# Patient Record
Sex: MALE | URBAN - METROPOLITAN AREA
[De-identification: names, ages, dates, MRNs, and addresses within clinical notes are randomized per-mention and may not be internally consistent; named-entity substitution may affect disease eponyms.]

---

## 2021-03-24 ENCOUNTER — APPOINTMENT (RX ONLY)
Dept: URBAN - METROPOLITAN AREA CLINIC 124 | Facility: CLINIC | Age: 56
Setting detail: DERMATOLOGY
End: 2021-03-24

## 2021-03-24 DIAGNOSIS — Z02.9 ENCOUNTER FOR ADMINISTRATIVE EXAMINATIONS, UNSPECIFIED: ICD-10-CM

## 2021-03-24 PROCEDURE — ? NO SHOW PLAN

## 2024-07-23 ENCOUNTER — OFFICE VISIT (OUTPATIENT)
Dept: URBAN - METROPOLITAN AREA CLINIC 68 | Facility: CLINIC | Age: 59
End: 2024-07-23
Payer: COMMERCIAL

## 2024-07-23 ENCOUNTER — DASHBOARD ENCOUNTERS (OUTPATIENT)
Age: 59
End: 2024-07-23

## 2024-07-23 VITALS
DIASTOLIC BLOOD PRESSURE: 90 MMHG | HEIGHT: 70 IN | SYSTOLIC BLOOD PRESSURE: 140 MMHG | BODY MASS INDEX: 26.48 KG/M2 | WEIGHT: 185 LBS

## 2024-07-23 DIAGNOSIS — R19.5 HEME POSITIVE STOOL: ICD-10-CM

## 2024-07-23 PROBLEM — 59614000: Status: ACTIVE | Noted: 2024-07-12

## 2024-07-23 PROCEDURE — 99204 OFFICE O/P NEW MOD 45 MIN: CPT | Performed by: INTERNAL MEDICINE

## 2024-07-23 RX ORDER — ALPRAZOLAM 0.5 MG/1
TAKE ONE TABLET BY MOUTH TWICE A DAY AS NEEDED TABLET ORAL
Qty: 180 UNSPECIFIED | Refills: 0 | Status: ACTIVE | COMMUNITY

## 2024-07-23 RX ORDER — SOD SULF/POT CHLORIDE/MAG SULF 1.479 G
12 TABLETS TABLET ORAL
Qty: 24 | Refills: 0 | OUTPATIENT
Start: 2024-07-23 | End: 2024-07-24

## 2024-07-23 RX ORDER — MONTELUKAST SODIUM 10 MG/1
TABLET, COATED ORAL
Qty: 90 TABLET | Refills: 0 | Status: ACTIVE | COMMUNITY

## 2024-07-23 RX ORDER — LISINOPRIL AND HYDROCHLOROTHIAZIDE TABLETS 10; 12.5 MG/1; MG/1
TAKE ONE TABLET BY MOUTH ONE TIME DAILY FOR 90 DAYS TABLET ORAL
Qty: 90 UNSPECIFIED | Refills: 0 | Status: ACTIVE | COMMUNITY

## 2024-07-23 RX ORDER — DULOXETINE HYDROCHLORIDE 30 MG/1
TAKE ONE CAPSULE BY MOUTH ONE TIME DAILY CAPSULE, DELAYED RELEASE PELLETS ORAL
Qty: 90 UNSPECIFIED | Refills: 0 | Status: ACTIVE | COMMUNITY

## 2024-07-23 NOTE — HPI-TODAY'S VISIT:
Case of a 58 YOM that comes in today for colonoscopy consult due to heme pos stools. Upon questioning he stated feeling well without acute complaints. Furthermore, he denied episodes of neither bright red blood per rectum (hematochezia) nor black/tarry stools (melena). He also denied any unexplained significant weight loss, frequent abdominal pain/distention, nausea, vomits, early satiety, tenesmus, rectal pain and changes in bowel habits or stool caliber. He did not complain of episodes of diarrhea interspersed with constipation.

## 2024-07-30 ENCOUNTER — LAB OUTSIDE AN ENCOUNTER (OUTPATIENT)
Dept: URBAN - METROPOLITAN AREA CLINIC 68 | Facility: CLINIC | Age: 59
End: 2024-07-30

## 2024-08-07 ENCOUNTER — LAB OUTSIDE AN ENCOUNTER (OUTPATIENT)
Dept: URBAN - METROPOLITAN AREA CLINIC 68 | Facility: CLINIC | Age: 59
End: 2024-08-07

## 2024-08-07 ENCOUNTER — CLAIMS CREATED FROM THE CLAIM WINDOW (OUTPATIENT)
Dept: URBAN - METROPOLITAN AREA SURGERY CENTER 12 | Facility: SURGERY CENTER | Age: 59
End: 2024-08-07
Payer: COMMERCIAL

## 2024-08-07 ENCOUNTER — TELEPHONE ENCOUNTER (OUTPATIENT)
Dept: URBAN - METROPOLITAN AREA CLINIC 68 | Facility: CLINIC | Age: 59
End: 2024-08-07

## 2024-08-07 ENCOUNTER — CLAIMS CREATED FROM THE CLAIM WINDOW (OUTPATIENT)
Dept: URBAN - METROPOLITAN AREA CLINIC 4 | Facility: CLINIC | Age: 59
End: 2024-08-07
Payer: COMMERCIAL

## 2024-08-07 DIAGNOSIS — K64.8 OTHER HEMORRHOIDS: ICD-10-CM

## 2024-08-07 DIAGNOSIS — D12.2 BENIGN NEOPLASM OF ASCENDING COLON: ICD-10-CM

## 2024-08-07 DIAGNOSIS — K57.30 COLONIC DIVERTICULOSIS: ICD-10-CM

## 2024-08-07 DIAGNOSIS — R19.5 HEME POSITIVE STOOL: ICD-10-CM

## 2024-08-07 DIAGNOSIS — K63.5 POLYP OF ASCENDING COLON, UNSPECIFIED TYPE: ICD-10-CM

## 2024-08-07 DIAGNOSIS — K57.30 DIVERTICULOSIS OF LARGE INTESTINE WITHOUT PERFORATION OR ABSCESS WITHOUT BLEEDING: ICD-10-CM

## 2024-08-07 PROCEDURE — 45385 COLONOSCOPY W/LESION REMOVAL: CPT | Performed by: INTERNAL MEDICINE

## 2024-08-07 PROCEDURE — 00811 ANES LWR INTST NDSC NOS: CPT | Performed by: NURSE ANESTHETIST, CERTIFIED REGISTERED

## 2024-08-07 PROCEDURE — 88305 TISSUE EXAM BY PATHOLOGIST: CPT | Performed by: PATHOLOGY

## 2024-08-07 RX ORDER — MONTELUKAST SODIUM 10 MG/1
TABLET, COATED ORAL
Qty: 90 TABLET | Refills: 0 | Status: ACTIVE | COMMUNITY

## 2024-08-07 RX ORDER — LISINOPRIL AND HYDROCHLOROTHIAZIDE TABLETS 10; 12.5 MG/1; MG/1
TAKE ONE TABLET BY MOUTH ONE TIME DAILY FOR 90 DAYS TABLET ORAL
Qty: 90 UNSPECIFIED | Refills: 0 | Status: ACTIVE | COMMUNITY

## 2024-08-07 RX ORDER — DULOXETINE HYDROCHLORIDE 30 MG/1
TAKE ONE CAPSULE BY MOUTH ONE TIME DAILY CAPSULE, DELAYED RELEASE PELLETS ORAL
Qty: 90 UNSPECIFIED | Refills: 0 | Status: ACTIVE | COMMUNITY

## 2024-08-07 RX ORDER — ALPRAZOLAM 0.5 MG/1
TAKE ONE TABLET BY MOUTH TWICE A DAY AS NEEDED TABLET ORAL
Qty: 180 UNSPECIFIED | Refills: 0 | Status: ACTIVE | COMMUNITY